# Patient Record
Sex: FEMALE | Race: WHITE
[De-identification: names, ages, dates, MRNs, and addresses within clinical notes are randomized per-mention and may not be internally consistent; named-entity substitution may affect disease eponyms.]

---

## 2021-07-02 ENCOUNTER — HOSPITAL ENCOUNTER (EMERGENCY)
Dept: HOSPITAL 41 - JD.ED | Age: 21
Discharge: LEFT BEFORE DISCHARGE | End: 2021-07-02
Payer: SELF-PAY

## 2021-07-02 DIAGNOSIS — F41.9: Primary | ICD-10-CM

## 2021-07-02 NOTE — EDM.PDOCBH
ED HPI GENERAL MEDICAL PROBLEM





- General


Chief Complaint: Behavioral/Psych


Stated Complaint: MEDICATION REQUEST


Time Seen by Provider: 07/02/21 16:01


Source of Information: Reports: Patient, RN Notes Reviewed


History Limitations: Reports: No Limitations





- History of Present Illness


INITIAL COMMENTS - FREE TEXT/NARRATIVE: 


Patient is a 20-year-old female presenting to the emergency department after 

being brought here by a representative [facility].  They report 

that she was quite anxious at their facility and that they feel she needs 

something for anxiety to get her through the weekend.  After visiting with the 

patient, she admits that she is feeling very anxious.  She talks about her 

sister who shot herself in the head a number of years ago  and that she is w

earing a necklace with her ashes around her neck.  She also talks about "the 

spirit moving through her "as she was [place].  She is 

currently living in an apartment with a friend.  States that she is traveling 

from [place].  She has been on medications in the past, however she cannot 

remember the name.  She states it was a antidepressant medication that also 

works for schizophrenia, however she has never been diagnosed with 

schizophrenia.  States that she has been diagnosed with anxiety and depression. 

She is not currently taking any medications.  She denies feeling suicidal or 

homicidal.





  ** Right Neck


Pain Score (Numeric/FACES): 7





  ** Right Head


Pain Score (Numeric/FACES): 10





  ** Right Knee


Pain Score (Numeric/FACES): 10





- Related Data


                                    Allergies











Allergy/AdvReac Type Severity Reaction Status Date / Time


 


No Known Allergies Allergy   Verified 07/02/21 15:53











Home Meds: 


                                    Home Meds





. [No Known Home Meds]  07/02/21 [History]











Past Medical History


Psychiatric History: Reports: Anxiety, Depression, Suicide Attempt


Other Psychiatric History: tried to kill self about 2 yrs ago





Social & Family History





- Tobacco Use


Tobacco Use Status *Q: Never Tobacco User





- Caffeine Use


Other Caffeine Use: doesn't answer questions





- Recreational Drug Use


Recreational Drug Type: Reports: Heroin





ED ROS GENERAL





- Review of Systems


Review Of Systems: See Below


Constitutional: Reports: No Symptoms


HEENT: Reports: No Symptoms


Respiratory: Reports: No Symptoms


Cardiovascular: Reports: No Symptoms


Endocrine: Reports: No Symptoms


GI/Abdominal: Reports: No Symptoms


: Reports: No Symptoms


Musculoskeletal: Reports: No Symptoms


Skin: Reports: No Symptoms


Neurological: Reports: No Symptoms


Psychiatric: Reports: Anxiety, Depression.  Denies: Hallucinations, Homicidal 

Ideation, Suicidal Ideation


Hematologic/Lymphatic: Reports: No Symptoms


Immunologic: Reports: No Symptoms





ED EXAM, BEHAVIORAL HEALTH





- Physical Exam


Exam: See Below


General Appearance: Alert, No Apparent Distress, Anxious


Eye Exam: Bilateral Eye: PERRL


Respiratory/Chest: No Respiratory Distress, Lungs Clear, Normal Breath Sounds, 

No Accessory Muscle Use, Chest Non-Tender


Cardiovascular: Normal Peripheral Pulses, Regular Rate, Rhythm, No Edema, No 

Gallop, No JVD, No Murmur, No Rub


GI/Abdominal: Normal Bowel Sounds, Soft, Non-Tender, No Organomegaly, No 

Distention, No Abnormal Bruit, No Mass


Neurological: Alert, Normal Mood/Affect, CN II-XII Intact, Normal Cognition, 

Normal Gait, Normal Reflexes, No Motor/Sensory Deficits, Oriented x 3


Psychiatric: Alert, Oriented, Flight of Ideas, Tangential Thoughts.  No: Melvi

icidal Thoughts, Suicidal Plan, Suicidal Thoughts, Auditory Hallucinations, 

Visual Hallucinations, Pressured Speech, Paranoid Thoughts, Threatening Behavior


Skin Exam: Warm, Dry, Intact, Normal color, No rash





COURSE, BEHAVIORAL HEALTH COMP





- Course


Vital Signs: 


                                Last Vital Signs











Temp  97.4 F   07/02/21 15:59


 


Pulse  73   07/02/21 15:59


 


Resp  20   07/02/21 15:59


 


BP  102/67   07/02/21 15:59


 


Pulse Ox  98   07/02/21 15:59











Orders, Labs, Meds: 


Medications














Discontinued Medications














Generic Name Dose Route Start Last Admin





  Trade Name Freq  PRN Reason Stop Dose Admin


 


Lorazepam  1 mg  07/02/21 16:26 





  Lorazepam 1 Mg Tab  PO  07/02/21 16:27 





  ONETIME ONE  











Discharge vs Psych Eval/Treatment:: 


Patient is a 20-year-old female presenting to the emergency department after 

being brought here by [facility].  They feel that she needs 

something for anxiety to get her through the weekend.  She does not want to go 

to the residential crisis center.  She has an apartment in here in town with a 

friend.  After visiting with the patient, she does appear to have flight of 

ideas and tangential thinking.  She talks about her sister who shot herself in 

the head when she was in eighth grade as well as having her ashes around her 

neck.  She makes reference to having an extra bone in her right shoulder.  

Denies any suicidal or homicidal thoughts.  She is not currently on any 

medications per her report.  I have ordered Ativan 1 mg p.o. to be given now.  I

 will visit with her further once she is feeling more calm





07/02/21 16:40


Nursing staff reports that patient left the facility.  She called her boyfriend 

to come pick her up.








Departure





- Departure


Time of Disposition: 16:42


Disposition: Eloped 07


Condition: Good


Clinical Impression: 


 Anxiety








- Discharge Information


Referrals: 


PCP,None [Primary Care Provider] - 


Forms:  ED Department Discharge





Sepsis Event Note (ED)





- Evaluation


Sepsis Screening Result: No Definite Risk





- Focused Exam


Vital Signs: 


                                   Vital Signs











  Temp Pulse Resp BP Pulse Ox


 


 07/02/21 15:59  97.4 F  73  20  102/67  98